# Patient Record
Sex: MALE | Race: BLACK OR AFRICAN AMERICAN | NOT HISPANIC OR LATINO | URBAN - METROPOLITAN AREA
[De-identification: names, ages, dates, MRNs, and addresses within clinical notes are randomized per-mention and may not be internally consistent; named-entity substitution may affect disease eponyms.]

---

## 2023-07-03 ENCOUNTER — HOSPITAL ENCOUNTER (INPATIENT)
Facility: HOSPITAL | Age: 43
LOS: 3 days | Discharge: HOME | DRG: 580 | End: 2023-07-06
Attending: EMERGENCY MEDICINE | Admitting: SURGERY

## 2023-07-03 DIAGNOSIS — T14.90XA TRAUMA: ICD-10-CM

## 2023-07-03 DIAGNOSIS — S81.801A: Primary | ICD-10-CM

## 2023-07-03 LAB
ALBUMIN SERPL-MCNC: 4.2 G/DL (ref 3.5–5)
ALBUMIN/GLOB SERPL: 1.6 RATIO (ref 1.1–2)
ALP SERPL-CCNC: 50 UNIT/L (ref 40–150)
ALT SERPL-CCNC: 31 UNIT/L (ref 0–55)
AST SERPL-CCNC: 34 UNIT/L (ref 5–34)
BASOPHILS # BLD AUTO: 0.04 X10(3)/MCL
BASOPHILS NFR BLD AUTO: 0.4 %
BILIRUBIN DIRECT+TOT PNL SERPL-MCNC: 0.6 MG/DL
BUN SERPL-MCNC: 10.8 MG/DL (ref 8.9–20.6)
CALCIUM SERPL-MCNC: 8.9 MG/DL (ref 8.4–10.2)
CHLORIDE SERPL-SCNC: 107 MMOL/L (ref 98–107)
CO2 SERPL-SCNC: 21 MMOL/L (ref 22–29)
CREAT SERPL-MCNC: 1.44 MG/DL (ref 0.73–1.18)
EOSINOPHIL # BLD AUTO: 0.07 X10(3)/MCL (ref 0–0.9)
EOSINOPHIL NFR BLD AUTO: 0.7 %
ERYTHROCYTE [DISTWIDTH] IN BLOOD BY AUTOMATED COUNT: 13.4 % (ref 11.5–17)
GFR SERPLBLD CREATININE-BSD FMLA CKD-EPI: >60 MLS/MIN/1.73/M2
GLOBULIN SER-MCNC: 2.7 GM/DL (ref 2.4–3.5)
GLUCOSE SERPL-MCNC: 96 MG/DL (ref 74–100)
HCT VFR BLD AUTO: 40.2 % (ref 42–52)
HGB BLD-MCNC: 13.8 G/DL (ref 14–18)
IMM GRANULOCYTES # BLD AUTO: 0.05 X10(3)/MCL (ref 0–0.04)
IMM GRANULOCYTES NFR BLD AUTO: 0.5 %
LYMPHOCYTES # BLD AUTO: 2.08 X10(3)/MCL (ref 0.6–4.6)
LYMPHOCYTES NFR BLD AUTO: 20.9 %
MCH RBC QN AUTO: 33.2 PG (ref 27–31)
MCHC RBC AUTO-ENTMCNC: 34.3 G/DL (ref 33–36)
MCV RBC AUTO: 96.6 FL (ref 80–94)
MONOCYTES # BLD AUTO: 0.81 X10(3)/MCL (ref 0.1–1.3)
MONOCYTES NFR BLD AUTO: 8.1 %
NEUTROPHILS # BLD AUTO: 6.9 X10(3)/MCL (ref 2.1–9.2)
NEUTROPHILS NFR BLD AUTO: 69.4 %
NRBC BLD AUTO-RTO: 0 %
PLATELET # BLD AUTO: 316 X10(3)/MCL (ref 130–400)
PMV BLD AUTO: 9.2 FL (ref 7.4–10.4)
POTASSIUM SERPL-SCNC: 3.9 MMOL/L (ref 3.5–5.1)
PROT SERPL-MCNC: 6.9 GM/DL (ref 6.4–8.3)
RBC # BLD AUTO: 4.16 X10(6)/MCL (ref 4.7–6.1)
SODIUM SERPL-SCNC: 138 MMOL/L (ref 136–145)
WBC # SPEC AUTO: 9.95 X10(3)/MCL (ref 4.5–11.5)

## 2023-07-03 PROCEDURE — 63600175 PHARM REV CODE 636 W HCPCS: Performed by: EMERGENCY MEDICINE

## 2023-07-03 PROCEDURE — 80053 COMPREHEN METABOLIC PANEL: CPT | Performed by: EMERGENCY MEDICINE

## 2023-07-03 PROCEDURE — 63600175 PHARM REV CODE 636 W HCPCS

## 2023-07-03 PROCEDURE — 25000003 PHARM REV CODE 250: Performed by: EMERGENCY MEDICINE

## 2023-07-03 PROCEDURE — 96365 THER/PROPH/DIAG IV INF INIT: CPT

## 2023-07-03 PROCEDURE — 90471 IMMUNIZATION ADMIN: CPT | Performed by: EMERGENCY MEDICINE

## 2023-07-03 PROCEDURE — 99285 EMERGENCY DEPT VISIT HI MDM: CPT | Mod: 25

## 2023-07-03 PROCEDURE — 90715 TDAP VACCINE 7 YRS/> IM: CPT | Performed by: EMERGENCY MEDICINE

## 2023-07-03 PROCEDURE — 11000001 HC ACUTE MED/SURG PRIVATE ROOM

## 2023-07-03 PROCEDURE — 85025 COMPLETE CBC W/AUTO DIFF WBC: CPT | Performed by: EMERGENCY MEDICINE

## 2023-07-03 RX ORDER — ENOXAPARIN SODIUM 100 MG/ML
40 INJECTION SUBCUTANEOUS EVERY 12 HOURS
Status: DISCONTINUED | OUTPATIENT
Start: 2023-07-03 | End: 2023-07-06 | Stop reason: HOSPADM

## 2023-07-03 RX ORDER — TALC
6 POWDER (GRAM) TOPICAL NIGHTLY PRN
Status: DISCONTINUED | OUTPATIENT
Start: 2023-07-04 | End: 2023-07-06 | Stop reason: HOSPADM

## 2023-07-03 RX ORDER — METHOCARBAMOL 500 MG/1
500 TABLET, FILM COATED ORAL 3 TIMES DAILY
Status: DISCONTINUED | OUTPATIENT
Start: 2023-07-04 | End: 2023-07-06 | Stop reason: HOSPADM

## 2023-07-03 RX ORDER — POLYETHYLENE GLYCOL 3350 17 G/17G
17 POWDER, FOR SOLUTION ORAL 2 TIMES DAILY
Status: DISCONTINUED | OUTPATIENT
Start: 2023-07-03 | End: 2023-07-06 | Stop reason: HOSPADM

## 2023-07-03 RX ORDER — GABAPENTIN 300 MG/1
300 CAPSULE ORAL 3 TIMES DAILY
Status: DISCONTINUED | OUTPATIENT
Start: 2023-07-04 | End: 2023-07-06 | Stop reason: HOSPADM

## 2023-07-03 RX ORDER — OXYCODONE HYDROCHLORIDE 5 MG/1
5 TABLET ORAL EVERY 4 HOURS PRN
Status: DISCONTINUED | OUTPATIENT
Start: 2023-07-04 | End: 2023-07-06 | Stop reason: HOSPADM

## 2023-07-03 RX ORDER — SODIUM CHLORIDE, SODIUM LACTATE, POTASSIUM CHLORIDE, CALCIUM CHLORIDE 600; 310; 30; 20 MG/100ML; MG/100ML; MG/100ML; MG/100ML
INJECTION, SOLUTION INTRAVENOUS CONTINUOUS
Status: DISCONTINUED | OUTPATIENT
Start: 2023-07-03 | End: 2023-07-06 | Stop reason: HOSPADM

## 2023-07-03 RX ORDER — DOCUSATE SODIUM 100 MG/1
100 CAPSULE, LIQUID FILLED ORAL 2 TIMES DAILY
Status: DISCONTINUED | OUTPATIENT
Start: 2023-07-03 | End: 2023-07-06 | Stop reason: HOSPADM

## 2023-07-03 RX ORDER — ADHESIVE BANDAGE
30 BANDAGE TOPICAL DAILY PRN
Status: DISCONTINUED | OUTPATIENT
Start: 2023-07-04 | End: 2023-07-06 | Stop reason: HOSPADM

## 2023-07-03 RX ORDER — OXYCODONE HYDROCHLORIDE 10 MG/1
10 TABLET ORAL EVERY 4 HOURS PRN
Status: DISCONTINUED | OUTPATIENT
Start: 2023-07-04 | End: 2023-07-06 | Stop reason: HOSPADM

## 2023-07-03 RX ORDER — ACETAMINOPHEN 325 MG/1
650 TABLET ORAL EVERY 4 HOURS
Status: DISCONTINUED | OUTPATIENT
Start: 2023-07-04 | End: 2023-07-06 | Stop reason: HOSPADM

## 2023-07-03 RX ADMIN — SODIUM CHLORIDE, POTASSIUM CHLORIDE, SODIUM LACTATE AND CALCIUM CHLORIDE: 600; 310; 30; 20 INJECTION, SOLUTION INTRAVENOUS at 11:07

## 2023-07-03 RX ADMIN — PIPERACILLIN AND TAZOBACTAM 4.5 G: 4; .5 INJECTION, POWDER, LYOPHILIZED, FOR SOLUTION INTRAVENOUS; PARENTERAL at 10:07

## 2023-07-03 RX ADMIN — TETANUS TOXOID, REDUCED DIPHTHERIA TOXOID AND ACELLULAR PERTUSSIS VACCINE, ADSORBED 0.5 ML: 5; 2.5; 8; 8; 2.5 SUSPENSION INTRAMUSCULAR at 10:07

## 2023-07-03 RX ADMIN — OXYCODONE HYDROCHLORIDE 5 MG: 5 TABLET ORAL at 11:07

## 2023-07-04 LAB
ALBUMIN SERPL-MCNC: 3.8 G/DL (ref 3.5–5)
ALBUMIN/GLOB SERPL: 1.5 RATIO (ref 1.1–2)
ALP SERPL-CCNC: 46 UNIT/L (ref 40–150)
ALT SERPL-CCNC: 28 UNIT/L (ref 0–55)
AST SERPL-CCNC: 33 UNIT/L (ref 5–34)
BASOPHILS # BLD AUTO: 0.03 X10(3)/MCL
BASOPHILS NFR BLD AUTO: 0.2 %
BILIRUBIN DIRECT+TOT PNL SERPL-MCNC: 0.7 MG/DL
BUN SERPL-MCNC: 10.8 MG/DL (ref 8.9–20.6)
CALCIUM SERPL-MCNC: 8.9 MG/DL (ref 8.4–10.2)
CHLORIDE SERPL-SCNC: 108 MMOL/L (ref 98–107)
CO2 SERPL-SCNC: 22 MMOL/L (ref 22–29)
CREAT SERPL-MCNC: 1.29 MG/DL (ref 0.73–1.18)
EOSINOPHIL # BLD AUTO: 0.04 X10(3)/MCL (ref 0–0.9)
EOSINOPHIL NFR BLD AUTO: 0.3 %
ERYTHROCYTE [DISTWIDTH] IN BLOOD BY AUTOMATED COUNT: 13.5 % (ref 11.5–17)
GFR SERPLBLD CREATININE-BSD FMLA CKD-EPI: >60 MLS/MIN/1.73/M2
GLOBULIN SER-MCNC: 2.5 GM/DL (ref 2.4–3.5)
GLUCOSE SERPL-MCNC: 110 MG/DL (ref 74–100)
HCT VFR BLD AUTO: 39 % (ref 42–52)
HGB BLD-MCNC: 13.4 G/DL (ref 14–18)
IMM GRANULOCYTES # BLD AUTO: 0.04 X10(3)/MCL (ref 0–0.04)
IMM GRANULOCYTES NFR BLD AUTO: 0.3 %
LYMPHOCYTES # BLD AUTO: 3.44 X10(3)/MCL (ref 0.6–4.6)
LYMPHOCYTES NFR BLD AUTO: 25.5 %
MAGNESIUM SERPL-MCNC: 2.1 MG/DL (ref 1.6–2.6)
MCH RBC QN AUTO: 32.8 PG (ref 27–31)
MCHC RBC AUTO-ENTMCNC: 34.4 G/DL (ref 33–36)
MCV RBC AUTO: 95.4 FL (ref 80–94)
MONOCYTES # BLD AUTO: 1.25 X10(3)/MCL (ref 0.1–1.3)
MONOCYTES NFR BLD AUTO: 9.3 %
NEUTROPHILS # BLD AUTO: 8.69 X10(3)/MCL (ref 2.1–9.2)
NEUTROPHILS NFR BLD AUTO: 64.4 %
NRBC BLD AUTO-RTO: 0 %
PHOSPHATE SERPL-MCNC: 4.7 MG/DL (ref 2.3–4.7)
PLATELET # BLD AUTO: 287 X10(3)/MCL (ref 130–400)
PMV BLD AUTO: 9.3 FL (ref 7.4–10.4)
POTASSIUM SERPL-SCNC: 4.2 MMOL/L (ref 3.5–5.1)
PROT SERPL-MCNC: 6.3 GM/DL (ref 6.4–8.3)
RBC # BLD AUTO: 4.09 X10(6)/MCL (ref 4.7–6.1)
SODIUM SERPL-SCNC: 140 MMOL/L (ref 136–145)
WBC # SPEC AUTO: 13.49 X10(3)/MCL (ref 4.5–11.5)

## 2023-07-04 PROCEDURE — 85025 COMPLETE CBC W/AUTO DIFF WBC: CPT

## 2023-07-04 PROCEDURE — 80053 COMPREHEN METABOLIC PANEL: CPT

## 2023-07-04 PROCEDURE — 84100 ASSAY OF PHOSPHORUS: CPT

## 2023-07-04 PROCEDURE — 11000001 HC ACUTE MED/SURG PRIVATE ROOM

## 2023-07-04 PROCEDURE — 25000003 PHARM REV CODE 250: Performed by: NURSE PRACTITIONER

## 2023-07-04 PROCEDURE — 25000003 PHARM REV CODE 250

## 2023-07-04 PROCEDURE — 63600175 PHARM REV CODE 636 W HCPCS

## 2023-07-04 PROCEDURE — 83735 ASSAY OF MAGNESIUM: CPT

## 2023-07-04 RX ORDER — AMOXICILLIN AND CLAVULANATE POTASSIUM 875; 125 MG/1; MG/1
1 TABLET, FILM COATED ORAL EVERY 12 HOURS
Status: DISCONTINUED | OUTPATIENT
Start: 2023-07-04 | End: 2023-07-06 | Stop reason: HOSPADM

## 2023-07-04 RX ORDER — KETAMINE HCL IN 0.9 % NACL 50 MG/5 ML
SYRINGE (ML) INTRAVENOUS
Status: COMPLETED
Start: 2023-07-04 | End: 2023-07-04

## 2023-07-04 RX ADMIN — ACETAMINOPHEN 650 MG: 325 TABLET, FILM COATED ORAL at 10:07

## 2023-07-04 RX ADMIN — GABAPENTIN 300 MG: 300 CAPSULE ORAL at 08:07

## 2023-07-04 RX ADMIN — OXYCODONE HYDROCHLORIDE 5 MG: 5 TABLET ORAL at 08:07

## 2023-07-04 RX ADMIN — METHOCARBAMOL 500 MG: 500 TABLET ORAL at 02:07

## 2023-07-04 RX ADMIN — ENOXAPARIN SODIUM 40 MG: 40 INJECTION SUBCUTANEOUS at 09:07

## 2023-07-04 RX ADMIN — OXYCODONE HYDROCHLORIDE 5 MG: 5 TABLET ORAL at 04:07

## 2023-07-04 RX ADMIN — POLYETHYLENE GLYCOL 3350 17 G: 17 POWDER, FOR SOLUTION ORAL at 08:07

## 2023-07-04 RX ADMIN — GABAPENTIN 300 MG: 300 CAPSULE ORAL at 09:07

## 2023-07-04 RX ADMIN — METHOCARBAMOL 500 MG: 500 TABLET ORAL at 08:07

## 2023-07-04 RX ADMIN — ENOXAPARIN SODIUM 40 MG: 40 INJECTION SUBCUTANEOUS at 08:07

## 2023-07-04 RX ADMIN — METHOCARBAMOL 500 MG: 500 TABLET ORAL at 09:07

## 2023-07-04 RX ADMIN — ACETAMINOPHEN 650 MG: 325 TABLET, FILM COATED ORAL at 06:07

## 2023-07-04 RX ADMIN — ACETAMINOPHEN 650 MG: 325 TABLET, FILM COATED ORAL at 02:07

## 2023-07-04 RX ADMIN — AMOXICILLIN AND CLAVULANATE POTASSIUM 1 TABLET: 875; 125 TABLET ORAL at 09:07

## 2023-07-04 RX ADMIN — GABAPENTIN 300 MG: 300 CAPSULE ORAL at 02:07

## 2023-07-04 RX ADMIN — DOCUSATE SODIUM 100 MG: 100 CAPSULE, LIQUID FILLED ORAL at 09:07

## 2023-07-04 RX ADMIN — PIPERACILLIN AND TAZOBACTAM 4.5 G: 4; .5 INJECTION, POWDER, LYOPHILIZED, FOR SOLUTION INTRAVENOUS; PARENTERAL at 06:07

## 2023-07-04 RX ADMIN — OXYCODONE HYDROCHLORIDE 10 MG: 10 TABLET ORAL at 09:07

## 2023-07-04 RX ADMIN — DOCUSATE SODIUM 100 MG: 100 CAPSULE, LIQUID FILLED ORAL at 08:07

## 2023-07-04 RX ADMIN — Medication 150 MG: at 12:07

## 2023-07-04 NOTE — CONSULTS
History           Chief Complaint   Patient presents with    Animal Bite       Pt to ed with dog bite to RLE with Avulsion- bleeding controlled without the need for a  tourniquet. Pms intact. Pt already gave statement to MAKEDA Zapata       44 y/o male with no pertinent pmhx presents to ED via EMS for a dog bite to his right leg with Avulsion. Pt reports his neighbor's dog bit him for the first time after walking out of his house. States he was able to ambulate after. He was given Toradol en route. Per triage, bleeding controlled without the need for a tourniquet.     The history is provided by the patient. No  was used.   Review of patient's allergies indicates:  No Known Allergies  No past medical history on file.  No past surgical history on file.  No family history on file.  Review of Systems   Constitutional:  Negative for fatigue, fever and unexpected weight change.   HENT:  Negative for congestion and rhinorrhea.    Eyes:  Negative for pain.   Respiratory:  Negative for chest tightness, shortness of breath and wheezing.    Cardiovascular:  Negative for chest pain.   Gastrointestinal:  Negative for abdominal pain, constipation, diarrhea, nausea and vomiting.   Genitourinary:  Negative for dysuria.   Musculoskeletal:  Negative for back pain and neck pain.        Right leg pain following dog bite   Skin:  Positive for wound.   Allergic/Immunologic: Negative for environmental allergies, food allergies and immunocompromised state.   Neurological:  Negative for dizziness and speech difficulty.   Hematological:  Does not bruise/bleed easily.   Psychiatric/Behavioral:  Negative for sleep disturbance and suicidal ideas.       Physical Exam             Initial Vitals [07/03/23 2207]   BP Pulse Resp Temp SpO2   124/74 75 16 98.6 °F (37 °C) 98 %       MAP           --              Physical Exam     Nursing note and vitals reviewed.  Constitutional: He appears well-developed and well-nourished. No  distress.   HENT:   Head: Normocephalic and atraumatic.   Eyes: EOM are normal. Pupils are equal, round, and reactive to light.   Neck: Trachea normal. Neck supple.   Normal range of motion.  Cardiovascular:  Normal rate and regular rhythm.           No murmur heard.  Pulmonary/Chest: Breath sounds normal. No respiratory distress.   Abdominal: Abdomen is soft. Bowel sounds are normal. He exhibits no distension. There is no abdominal tenderness.   Musculoskeletal:      Cervical back: Normal range of motion and neck supple.      Lumbar back: Normal.      Comments: 15x10 cm Avulsion with exposed muscle belly to right lower extremity.      Neurological: He is alert and oriented to person, place, and time. He has normal strength. No cranial nerve deficit.   Skin: Skin is warm and dry. No rash noted.   Psychiatric: He has a normal mood and affect. Judgment normal.                          ED Course   Procedures        Labs Reviewed   COMPREHENSIVE METABOLIC PANEL - Abnormal; Notable for the following components:       Result Value      Carbon Dioxide 21 (*)       Creatinine 1.44 (*)       All other components within normal limits   CBC WITH DIFFERENTIAL - Abnormal; Notable for the following components:     RBC 4.16 (*)       Hgb 13.8 (*)       Hct 40.2 (*)       MCV 96.6 (*)       MCH 33.2 (*)       IG# 0.05 (*)       All other components within normal limits   CBC W/ AUTO DIFFERENTIAL     Narrative:      The following orders were created for panel order CBC auto differential.  Procedure                               Abnormality         Status                     ---------                               -----------         ------                     CBC with Differential[[id number]]        Abnormal            Final result                  Please view results for these tests on the individual orders.            Imaging Results                  X-Ray Tibia Fibula 2 View Right (Final result)  Result time 07/03/23 23:25:18             Final result by Vadim Stovall MD (07/03/23 23:25:18)                           Impression:        Severe soft tissue injury.        Electronically signed by:        Vadim Stovall  Date:                                        07/03/2023  Time:                                                23:25                     Narrative:     EXAMINATION:  XR TIBIA FIBULA 2 VIEW RIGHT     CLINICAL HISTORY:  Injury, unspecified, initial encounter     TECHNIQUE:  Two views     COMPARISON:  None available     FINDINGS:  There is severe soft tissue injury with inflammations and emphysema mid to distal posterior calf.  A definite radiopaque foreign body is not visualized.  No acute fracture or dislocation.                                               Medications   lactated ringers infusion ( Intravenous New Bag 7/3/23 2339)   enoxaparin injection 40 mg (40 mg Subcutaneous Not Given 7/3/23 2345)   acetaminophen tablet 650 mg (has no administration in time range)   oxyCODONE immediate release tablet 5 mg (5 mg Oral Given 7/3/23 2353)   oxyCODONE immediate release tablet Tab 10 mg (has no administration in time range)   methocarbamoL tablet 500 mg (has no administration in time range)   gabapentin capsule 300 mg (has no administration in time range)   melatonin tablet 6 mg (has no administration in time range)   polyethylene glycol packet 17 g (17 g Oral Not Given 7/3/23 2345)   docusate sodium capsule 100 mg (100 mg Oral Not Given 7/3/23 2345)   magnesium hydroxide 400 mg/5 ml suspension 2,400 mg (2,400 mg Oral Not Given 7/3/23 2349)   piperacillin-tazobactam (ZOSYN) 4.5 g in dextrose 5 % in water (D5W) 5 % 100 mL IVPB (MB+) (has no administration in time range)   ketamine in 0.9 % sod chloride 50 mg/5 mL (10 mg/mL) injection (has no administration in time range)   piperacillin-tazobactam (ZOSYN) 4.5 g in dextrose 5 % in water (D5W) 5 % 100 mL IVPB (MB+) (0 g Intravenous Stopped 7/3/23 2306)   Tdap (BOOSTRIX) vaccine injection  0.5 mL (0.5 mLs Intramuscular Given 7/3/23 2231)                   Medical Decision Making  The differential diagnosis includes, but is not limited to: fracture, avulsion, foreign body, and laceration.     Bleeding well-controlled neurovascularly intact large avulsion patient tachycardic.  Discussed case with Trauma surgery radiographically without abnormality will admit and washout and evaluate for graft     Problems Addressed:  Avulsion of right lower extremity, initial encounter: complicated acute illness or injury that poses a threat to life or bodily functions  Trauma: acute illness or injury     Amount and/or Complexity of Data Reviewed  Labs: ordered. Decision-making details documented in ED Course.  Radiology: ordered and independent interpretation performed. Decision-making details documented in ED Course.  Discussion of management or test interpretation with external provider(s): Surgical hospitalist trauma surgeon     Risk  Decision regarding hospitalization.               ED Course as of 07/04/23 0105   Mon Jul 03, 2023 2223 Consulted trauma [MM]   2322 Large avulsion discussed case with surgery [FK]       ED Course User Index  [FK] Arian Garcia III, MD  [MM] Radha Campos             Clinical Impression:   Agree with wound vac  Wound will require some time to granulate  Please Set pt up from home wound vac with regular vac changes   Will plan for [facility] as outpt in a few weeks when granulation begins to form  Will follow while here

## 2023-07-04 NOTE — ED PROVIDER NOTES
Encounter Date: 7/3/2023    SCRIBE #1 NOTE: I, Sandra Vi, am scribing for, and in the presence of,  Arian Garcia III, MD. I have scribed the following portions of the note - Other sections scribed: HPI, ROS, PE.     History     Chief Complaint   Patient presents with    Animal Bite     Pt to ed with dog bite to RLE with Avulsion- bleeding controlled without the need for a  tourniquet. Pms intact. Pt already gave statement to MAKEDA Zapata      44 y/o male with no pertinent pmhx presents to ED via EMS for a dog bite to his right leg with Avulsion. Pt reports his neighbor's dog bit him for the first time after walking out of his house. States he was able to ambulate after. He was given Toradol en route. Per triage, bleeding controlled without the need for a tourniquet.    The history is provided by the patient. No  was used.   Review of patient's allergies indicates:  No Known Allergies  No past medical history on file.  No past surgical history on file.  No family history on file.     Review of Systems   Constitutional:  Negative for fatigue, fever and unexpected weight change.   HENT:  Negative for congestion and rhinorrhea.    Eyes:  Negative for pain.   Respiratory:  Negative for chest tightness, shortness of breath and wheezing.    Cardiovascular:  Negative for chest pain.   Gastrointestinal:  Negative for abdominal pain, constipation, diarrhea, nausea and vomiting.   Genitourinary:  Negative for dysuria.   Musculoskeletal:  Negative for back pain and neck pain.        Right leg pain following dog bite   Skin:  Positive for wound.   Allergic/Immunologic: Negative for environmental allergies, food allergies and immunocompromised state.   Neurological:  Negative for dizziness and speech difficulty.   Hematological:  Does not bruise/bleed easily.   Psychiatric/Behavioral:  Negative for sleep disturbance and suicidal ideas.      Physical Exam     Initial Vitals [07/03/23 2207]   BP Pulse Resp Temp  SpO2   124/74 75 16 98.6 °F (37 °C) 98 %      MAP       --         Physical Exam    Nursing note and vitals reviewed.  Constitutional: He appears well-developed and well-nourished. No distress.   HENT:   Head: Normocephalic and atraumatic.   Eyes: EOM are normal. Pupils are equal, round, and reactive to light.   Neck: Trachea normal. Neck supple.   Normal range of motion.  Cardiovascular:  Normal rate and regular rhythm.           No murmur heard.  Pulmonary/Chest: Breath sounds normal. No respiratory distress.   Abdominal: Abdomen is soft. Bowel sounds are normal. He exhibits no distension. There is no abdominal tenderness.   Musculoskeletal:      Cervical back: Normal range of motion and neck supple.      Lumbar back: Normal.      Comments: 15x10 cm Avulsion with exposed muscle belly to right lower extremity.     Neurological: He is alert and oriented to person, place, and time. He has normal strength. No cranial nerve deficit.   Skin: Skin is warm and dry. No rash noted.   Psychiatric: He has a normal mood and affect. Judgment normal.                     ED Course   Procedures  Labs Reviewed   COMPREHENSIVE METABOLIC PANEL - Abnormal; Notable for the following components:       Result Value    Carbon Dioxide 21 (*)     Creatinine 1.44 (*)     All other components within normal limits   CBC WITH DIFFERENTIAL - Abnormal; Notable for the following components:    RBC 4.16 (*)     Hgb 13.8 (*)     Hct 40.2 (*)     MCV 96.6 (*)     MCH 33.2 (*)     IG# 0.05 (*)     All other components within normal limits   CBC W/ AUTO DIFFERENTIAL    Narrative:     The following orders were created for panel order CBC auto differential.  Procedure                               Abnormality         Status                     ---------                               -----------         ------                     CBC with Differential[[id number]]        Abnormal            Final result                 Please view results for these tests on  the individual orders.          Imaging Results              X-Ray Tibia Fibula 2 View Right (Final result)  Result time 07/03/23 23:25:18      Final result by Vadim Stovall MD (07/03/23 23:25:18)                   Impression:      Severe soft tissue injury.      Electronically signed by: Vadim Stovall  Date:    07/03/2023  Time:    23:25               Narrative:    EXAMINATION:  XR TIBIA FIBULA 2 VIEW RIGHT    CLINICAL HISTORY:  Injury, unspecified, initial encounter    TECHNIQUE:  Two views    COMPARISON:  None available    FINDINGS:  There is severe soft tissue injury with inflammations and emphysema mid to distal posterior calf.  A definite radiopaque foreign body is not visualized.  No acute fracture or dislocation.                                       Medications   lactated ringers infusion ( Intravenous New Bag 7/3/23 2339)   enoxaparin injection 40 mg (40 mg Subcutaneous Not Given 7/3/23 2345)   acetaminophen tablet 650 mg (has no administration in time range)   oxyCODONE immediate release tablet 5 mg (5 mg Oral Given 7/3/23 2353)   oxyCODONE immediate release tablet Tab 10 mg (has no administration in time range)   methocarbamoL tablet 500 mg (has no administration in time range)   gabapentin capsule 300 mg (has no administration in time range)   melatonin tablet 6 mg (has no administration in time range)   polyethylene glycol packet 17 g (17 g Oral Not Given 7/3/23 2345)   docusate sodium capsule 100 mg (100 mg Oral Not Given 7/3/23 2345)   magnesium hydroxide 400 mg/5 ml suspension 2,400 mg (2,400 mg Oral Not Given 7/3/23 2349)   piperacillin-tazobactam (ZOSYN) 4.5 g in dextrose 5 % in water (D5W) 5 % 100 mL IVPB (MB+) (has no administration in time range)   ketamine in 0.9 % sod chloride 50 mg/5 mL (10 mg/mL) injection (has no administration in time range)   piperacillin-tazobactam (ZOSYN) 4.5 g in dextrose 5 % in water (D5W) 5 % 100 mL IVPB (MB+) (0 g Intravenous Stopped 7/3/23 2306)   Tdap (BOOSTRIX)  vaccine injection 0.5 mL (0.5 mLs Intramuscular Given 7/3/23 2231)              Scribe Attestation:   Scribe #1: I performed the above scribed service and the documentation accurately describes the services I performed. I attest to the accuracy of the note.    Attending Attestation:           Physician Attestation for Scribe:  Physician Attestation Statement for Scribe #1: I, Arian Garcia III, MD, reviewed documentation, as scribed by Sandra Lambert in my presence, and it is both accurate and complete.         Medical Decision Making  The differential diagnosis includes, but is not limited to: fracture, avulsion, foreign body, and laceration.    Bleeding well-controlled neurovascularly intact large avulsion patient tachycardic.  Discussed case with Trauma surgery radiographically without abnormality will admit and washout and evaluate for graft    Problems Addressed:  Avulsion of right lower extremity, initial encounter: complicated acute illness or injury that poses a threat to life or bodily functions  Trauma: acute illness or injury    Amount and/or Complexity of Data Reviewed  Labs: ordered. Decision-making details documented in ED Course.  Radiology: ordered and independent interpretation performed. Decision-making details documented in ED Course.  Discussion of management or test interpretation with external provider(s): Surgical hospitalist trauma surgeon    Risk  Decision regarding hospitalization.            ED Course as of 07/04/23 0105 Mon Jul 03, 2023 2223 Consulted trauma [MM]   2322 Large avulsion discussed case with surgery [FK]      ED Course User Index  [FK] Arian Garcia III, MD  [MM] Radha Campos                 Clinical Impression:   Final diagnoses:  [T14.90XA] Trauma  [S81.801A] Avulsion of right lower extremity, initial encounter (Primary)        ED Disposition Condition    Admit                 Arian Garcia III, MD  07/04/23 0106

## 2023-07-04 NOTE — PROCEDURES
Wound Debridement Procedure    Patient Name: Billy Correa  MRN: [id number]  YOB: 1980  Admit Date: 7/3/2023  HD#1  Date of Procedure: 07/04/2023    Procedure: wound washout and laceration repair.  Location: right leg   Laceration dimensions: Approximately 15cm x 10cm   Anesthesia: ketamine sedation     Procedure in Detail:   The wound was exposed and sedation was initiated by staff. The patient was monitored using end tidal CO2 and telemtry. Utilizing a clean technique, the wound was carefully explored for any abnormalities including signs of infection and foreign bodies. Devascularized tissue on the posterior aspect of the leg and the inferior edges was removed sharply. The wound cavity was copiously irrigated with sterile saline to remove any remaining debris. The wound was subsequently cleansed with betadine and irrigated again with sterile saline. Hemostasis was achieved using a combination of pressure and 2-0 Vicryl suture in a figure of eight fashion.  The wound was then dressed with saline moistened gauze, kerlix, and Ace bandages. The patient tolerated the procedure well without complications.     Follow up:  Wound vac to be placed bedside tomorrow   Continue local wound care     Kmimy West MD   [facility] Surgery PGY 4

## 2023-07-04 NOTE — TERTIARY TRAUMA SURVEY NOTE
TERTIARY TRAUMA SURVEY (TTS)    List Injuries Identified to Date:   1. Dog bit to posterior RLE    List Operations and Procedures:   1. Washout and laceration repair  2. Bedside wound vac placement    No past surgical history on file.    Incidental findings:   1. none    Past Medical History:   No past medical history on file.    No past surgical history on file.      Tertiary Physical Exam:     Physical Exam  Constitutional:       Appearance: Normal appearance.   HENT:      Head: Normocephalic.   Eyes:      Extraocular Movements: Extraocular movements intact.   Cardiovascular:      Rate and Rhythm: Normal rate and regular rhythm.      Pulses: Normal pulses.      Comments: 2+ right DP pulse  Pulmonary:      Effort: Pulmonary effort is normal.   Abdominal:      General: Abdomen is flat.      Palpations: Abdomen is soft.   Musculoskeletal:         General: Signs of injury (15cm x 10cm wound on right posterior calf with wound vac in place) present.      Cervical back: Normal range of motion.   Skin:     General: Skin is warm and dry.   Neurological:      General: No focal deficit present.      Mental Status: He is alert.      Comments: Intact sensation of right toes        Imaging Review:     Imaging Results              X-Ray Tibia Fibula 2 View Right (Final result)  Result time 07/03/23 23:25:18      Final result by Vadim Stovall MD (07/03/23 23:25:18)                   Impression:      Severe soft tissue injury.      Electronically signed by: Vadim Stovall  Date:    07/03/2023  Time:    23:25               Narrative:    EXAMINATION:  XR TIBIA FIBULA 2 VIEW RIGHT    CLINICAL HISTORY:  Injury, unspecified, initial encounter    TECHNIQUE:  Two views    COMPARISON:  None available    FINDINGS:  There is severe soft tissue injury with inflammations and emphysema mid to distal posterior calf.  A definite radiopaque foreign body is not visualized.  No acute fracture or dislocation.                                        Lab Review:   CBC:  Recent Labs   Lab Result Units 07/03/23 2235 07/04/23  0444   WBC x10(3)/mcL 9.95 13.49*   RBC x10(6)/mcL 4.16* 4.09*   Hgb g/dL 13.8* 13.4*   Hct % 40.2* 39.0*   Platelet x10(3)/mcL 316 287   MCV fL 96.6* 95.4*   MCH pg 33.2* 32.8*   MCHC g/dL 34.3 34.4       CMP:  Recent Labs   Lab Result Units 07/03/23 2235 07/04/23 0444   Calcium Level Total mg/dL 8.9 8.9   Albumin Level g/dL 4.2 3.8   Sodium Level mmol/L 138 140   Potassium Level mmol/L 3.9 4.2   Carbon Dioxide mmol/L 21* 22   Blood Urea Nitrogen mg/dL 10.8 10.8   Creatinine mg/dL 1.44* 1.29*   Alkaline Phosphatase unit/L 50 46   Alanine Aminotransferase unit/L 31 28   Aspartate Aminotransferase unit/L 34 33   Bilirubin Total mg/dL 0.6 0.7       Troponin:  No results for input(s): TROPONINI in the last 2160 hours.    ETOH:  No results for input(s): ETHANOL in the last 72 hours.     Urine Drug Screen:  No results for input(s): COCAINE, OPIATE, BARBITURATE, AMPHETAMINE, FENTANYL, CANNABINOIDS, MDMA in the last 72 hours.    Invalid input(s): BENZODIAZEPINE, PHENCYCLIDINE     Assessment and Plan:2   Billy Correa is a 43 year old male s/p RLE avulsion from dog bite. Also had CITLALI on admission. Imaging negative for fx.    - regular diet  - home med rec  - transition to PO augmentin on discharge  - mm pain control  - NV checks    Thomas Cardoza MD  Trauma Surgery  7/4/2023 9:47 AM

## 2023-07-04 NOTE — H&P
"   Trauma Surgery   History and Physical Note    Patient Name: Billy Correa  YOB: 1980  Date: 07/03/2023 11:12 PM  Date of Admission: 7/3/2023  HD#0  POD#* No surgery found *    PRESENTING HISTORY   Chief Complaint/Reason for Admission: dog bite    History of Present Illness:  Billy Correa is a 43-year-old male who presents to the ED following dog bite to the right lower extremity with avulsion.  He reports his neighbor's dog, an american bully, bit him after walking out of his house.  Unknown tetanus. Denies past medical history, but he smokes 1 ppd cigarettes.     Review of Systems:  12 point ROS negative except as stated in HPI    PAST HISTORY:   Past medical history:  No past medical history on file.    Past surgical history:  No past surgical history on file.    Family history:  No family history on file.    Social history:  Social History     Socioeconomic History    Marital status:      Social History     Tobacco Use   Smoking Status Not on file   Smokeless Tobacco Not on file      Social History     Substance and Sexual Activity   Alcohol Use Not on file        MEDICATIONS & ALLERGIES:   Allergies: Review of patient's allergies indicates:  No Known Allergies  Home Meds: No current outpatient medications   No current facility-administered medications on file prior to encounter.     No current outpatient medications on file prior to encounter.      No current facility-administered medications on file prior to encounter.     No current outpatient medications on file prior to encounter.     Scheduled Meds:  Continuous Infusions:  PRN Meds:    OBJECTIVE:   Vital Signs:  VITAL SIGNS: 24 HR MIN & MAX LAST   Temp  Min: 98.6 °F (37 °C)  Max: 98.6 °F (37 °C)  98.6 °F (37 °C)   BP  Min: 124/74  Max: 124/74  124/74    Pulse  Min: 75  Max: 75  75    Resp  Min: 16  Max: 16  16    SpO2  Min: 98 %  Max: 98 %  98 %      HT: 5' 7" (170.2 cm)  WT: 88.5 kg (195 lb)  BMI: 30.5   Intake/output: No " intake/output data recorded.     Lines/drains/airway:       Physical Exam:  General:  Well developed, well nourished, no acute distress  HEENT:  Normocephalic, atraumatic  CV:  RR, 2+ DPs bilaterally  Resp: NWOB  GI:  Abdomen soft, non-tender, non-distended  :  Deferred  MSK:  [id number] avulsion w/ muscle belly and tendon exposed of the RLE, NV intact  Neuro: GCS 15. CNII-XII grossly intact, alert and oriented to person, place, and time. Strength and motor function grossly intact to all extremities, sensation intact to all extremities.            Labs:  Troponin:  No results for input(s): TROPONINI in the last 72 hours.  CBC:  Recent Labs     07/03/23  2235   WBC 9.95   RBC 4.16*   HGB 13.8*   HCT 40.2*      MCV 96.6*   MCH 33.2*   MCHC 34.3     CMP:  Recent Labs     07/03/23  2235   CALCIUM 8.9   ALBUMIN 4.2      K 3.9   CO2 21*   BUN 10.8   CREATININE 1.44*   ALKPHOS 50   ALT 31   AST 34   BILITOT 0.6     Lactic Acid:  No results for input(s): LACTATE in the last 72 hours.  ETOH:  No results for input(s): ETHANOL in the last 72 hours.   Urine Drug Screen:  No results for input(s): COCAINE, OPIATE, BARBITURATE, AMPHETAMINE, FENTANYL, CANNABINOIDS, MDMA in the last 72 hours.    Invalid input(s): BENZODIAZEPINE, PHENCYCLIDINE   ABG  No results for input(s): PH, PO2, PCO2, HCO3, BE in the last 168 hours.      Diagnostic Results:  X-Ray Tibia Fibula 2 View Right    (Results Pending)       ASSESSMENT & PLAN:    Billy Correa is a 43 year old male s/p RLE avulsion from dog bite.  CITLALI.    - right tibia fibula x-ray negative for fracture  -admit to Trauma Floor  -NPO  -consult plastics   -maintenance IV fluids  -home med rec  - tetanus   - antibiotics   - mm pain control  - IS  - NV checks     ADDENDUM:  RLE washout at bedside, will place [place] in the morning    7/3/2023 11:13 PM     The above findings, diagnostics, and treatment plan were discussed with Dr. Kal Suggs who will follow with further assessments  and recommendations. Please call with any questions, concerns, or clinical status changes.

## 2023-07-05 LAB
ALBUMIN SERPL-MCNC: 3.4 G/DL (ref 3.5–5)
ALBUMIN/GLOB SERPL: 1.5 RATIO (ref 1.1–2)
ALP SERPL-CCNC: 43 UNIT/L (ref 40–150)
ALT SERPL-CCNC: 20 UNIT/L (ref 0–55)
AST SERPL-CCNC: 24 UNIT/L (ref 5–34)
BASOPHILS # BLD AUTO: 0.04 X10(3)/MCL
BASOPHILS NFR BLD AUTO: 0.5 %
BILIRUBIN DIRECT+TOT PNL SERPL-MCNC: 0.7 MG/DL
BUN SERPL-MCNC: 8.2 MG/DL (ref 8.9–20.6)
CALCIUM SERPL-MCNC: 8.4 MG/DL (ref 8.4–10.2)
CHLORIDE SERPL-SCNC: 110 MMOL/L (ref 98–107)
CO2 SERPL-SCNC: 23 MMOL/L (ref 22–29)
CREAT SERPL-MCNC: 1.28 MG/DL (ref 0.73–1.18)
EOSINOPHIL # BLD AUTO: 0.17 X10(3)/MCL (ref 0–0.9)
EOSINOPHIL NFR BLD AUTO: 2.1 %
ERYTHROCYTE [DISTWIDTH] IN BLOOD BY AUTOMATED COUNT: 13.5 % (ref 11.5–17)
GFR SERPLBLD CREATININE-BSD FMLA CKD-EPI: >60 MLS/MIN/1.73/M2
GLOBULIN SER-MCNC: 2.3 GM/DL (ref 2.4–3.5)
GLUCOSE SERPL-MCNC: 100 MG/DL (ref 74–100)
HCT VFR BLD AUTO: 38.5 % (ref 42–52)
HGB BLD-MCNC: 12.9 G/DL (ref 14–18)
IMM GRANULOCYTES # BLD AUTO: 0.02 X10(3)/MCL (ref 0–0.04)
IMM GRANULOCYTES NFR BLD AUTO: 0.2 %
LYMPHOCYTES # BLD AUTO: 3.51 X10(3)/MCL (ref 0.6–4.6)
LYMPHOCYTES NFR BLD AUTO: 42.4 %
MCH RBC QN AUTO: 32.7 PG (ref 27–31)
MCHC RBC AUTO-ENTMCNC: 33.5 G/DL (ref 33–36)
MCV RBC AUTO: 97.7 FL (ref 80–94)
MONOCYTES # BLD AUTO: 0.93 X10(3)/MCL (ref 0.1–1.3)
MONOCYTES NFR BLD AUTO: 11.2 %
NEUTROPHILS # BLD AUTO: 3.6 X10(3)/MCL (ref 2.1–9.2)
NEUTROPHILS NFR BLD AUTO: 43.6 %
NRBC BLD AUTO-RTO: 0 %
PLATELET # BLD AUTO: 286 X10(3)/MCL (ref 130–400)
PMV BLD AUTO: 9.9 FL (ref 7.4–10.4)
POTASSIUM SERPL-SCNC: 4.1 MMOL/L (ref 3.5–5.1)
PROT SERPL-MCNC: 5.7 GM/DL (ref 6.4–8.3)
RBC # BLD AUTO: 3.94 X10(6)/MCL (ref 4.7–6.1)
SODIUM SERPL-SCNC: 140 MMOL/L (ref 136–145)
WBC # SPEC AUTO: 8.27 X10(3)/MCL (ref 4.5–11.5)

## 2023-07-05 PROCEDURE — 80053 COMPREHEN METABOLIC PANEL: CPT

## 2023-07-05 PROCEDURE — 85025 COMPLETE CBC W/AUTO DIFF WBC: CPT

## 2023-07-05 PROCEDURE — 25000003 PHARM REV CODE 250: Performed by: NURSE PRACTITIONER

## 2023-07-05 PROCEDURE — 94761 N-INVAS EAR/PLS OXIMETRY MLT: CPT

## 2023-07-05 PROCEDURE — 25000003 PHARM REV CODE 250

## 2023-07-05 PROCEDURE — 63600175 PHARM REV CODE 636 W HCPCS

## 2023-07-05 PROCEDURE — 11000001 HC ACUTE MED/SURG PRIVATE ROOM

## 2023-07-05 RX ADMIN — GABAPENTIN 300 MG: 300 CAPSULE ORAL at 09:07

## 2023-07-05 RX ADMIN — OXYCODONE HYDROCHLORIDE 10 MG: 10 TABLET ORAL at 09:07

## 2023-07-05 RX ADMIN — ACETAMINOPHEN 650 MG: 325 TABLET, FILM COATED ORAL at 02:07

## 2023-07-05 RX ADMIN — ACETAMINOPHEN 650 MG: 325 TABLET, FILM COATED ORAL at 09:07

## 2023-07-05 RX ADMIN — OXYCODONE HYDROCHLORIDE 10 MG: 10 TABLET ORAL at 05:07

## 2023-07-05 RX ADMIN — ENOXAPARIN SODIUM 40 MG: 40 INJECTION SUBCUTANEOUS at 09:07

## 2023-07-05 RX ADMIN — DOCUSATE SODIUM 100 MG: 100 CAPSULE, LIQUID FILLED ORAL at 09:07

## 2023-07-05 RX ADMIN — AMOXICILLIN AND CLAVULANATE POTASSIUM 1 TABLET: 875; 125 TABLET ORAL at 09:07

## 2023-07-05 RX ADMIN — METHOCARBAMOL 500 MG: 500 TABLET ORAL at 02:07

## 2023-07-05 RX ADMIN — ACETAMINOPHEN 325 MG: 325 TABLET, FILM COATED ORAL at 06:07

## 2023-07-05 RX ADMIN — METHOCARBAMOL 500 MG: 500 TABLET ORAL at 09:07

## 2023-07-05 RX ADMIN — GABAPENTIN 300 MG: 300 CAPSULE ORAL at 02:07

## 2023-07-05 NOTE — PROGRESS NOTES
Trauma Surgery   Daily Progress Note     HD#2  POD#1 Day Post-Op    Subjective  AF, VSS  NAEON  Pain well controlled  Wound vac output 150cc     Scheduled Meds:   acetaminophen  650 mg Oral Q4H    amoxicillin-clavulanate 875-125mg  1 tablet Oral Q12H    docusate sodium  100 mg Oral BID    enoxparin  40 mg Subcutaneous Q12H    gabapentin  300 mg Oral TID    methocarbamoL  500 mg Oral TID    polyethylene glycol  17 g Oral BID       Continuous Infusions:   lactated ringers Stopped (07/04/23 0610)       PRN Meds:magnesium hydroxide 400 mg/5 ml, melatonin, oxyCODONE, oxyCODONE     Objective  Temp:  [97.8 °F (36.6 °C)-98.4 °F (36.9 °C)] 97.9 °F (36.6 °C)  Pulse:  [55-63] 63  Resp:  [16-18] 18  SpO2:  [95 %-97 %] 96 %  BP: (116-141)/(68-83) 141/83     Gen: NAD, AAOx3  HEENT: EOMI  CV: RR  Resp: no shortness of breath, normal WOB   Abd: soft, non-distended, ATTP    Ext:  Full ROM. Wound vac in place over right posterior calf wound with serosanguinous drainage     Labs  Recent Labs     07/03/23 2235 07/04/23  0444 07/05/23  0507   WBC 9.95 13.49* 8.27   HGB 13.8* 13.4* 12.9*   HCT 40.2* 39.0* 38.5*    287 286     Recent Labs     07/03/23 2235 07/04/23  0444 07/05/23  0507    140 140   K 3.9 4.2 4.1   CO2 21* 22 23   BUN 10.8 10.8 8.2*   CREATININE 1.44* 1.29* 1.28*   CALCIUM 8.9 8.9 8.4   MG  --  2.10  --    PHOS  --  4.7  --    ALBUMIN 4.2 3.8 3.4*   BILITOT 0.6 0.7 0.7   AST 34 33 24   ALKPHOS 50 46 43   ALT 31 28 20     No results for input(s): POCTGLUCOSE in the last 72 hours.     Imaging  No results found in the last 24 hours.       Assessment/Plan  Consults:   Plastic Surgery   Therapy:  Physical Therapy Weight bearing status:   RUE: WBAT  LUE: WBAT  RLE: WBAT  LLE: WBAT Precautions:  Standard   Seizure prophylaxis:  Not indicated. VTE prophylaxis:     Prophylactic Lovenox 40mg BID  GI prophylaxis:  Not indicated. Tolerating ordered diet.   Outpatient follow up:  [facility]  Plastic surgery  Disposition:  Home with home health     Billy Correa is a 43 year old male s/p RLE avulsion from dog bite. Also had CITLALI on admission. Imaging negative for fx.    - CITLALI resolving, cont fluids  - regular diet  - augmentin  - mm pain control  - NV checks  - CM about home health    Thomas Cardoza MD  Trauma Surgery  7/5/2023 10:51 AM

## 2023-07-05 NOTE — PLAN OF CARE
Problem: Adult Inpatient Plan of Care  Goal: Plan of Care Review  Outcome: Ongoing, Progressing  Goal: Patient-Specific Goal (Individualized)  Outcome: Ongoing, Progressing  Goal: Absence of Hospital-Acquired Illness or Injury  Outcome: Ongoing, Progressing  Goal: Optimal Comfort and Wellbeing  Outcome: Ongoing, Progressing  Goal: Readiness for Transition of Care  Outcome: Ongoing, Progressing     Problem: Impaired Wound Healing  Goal: Optimal Wound Healing  Outcome: Ongoing, Progressing     Problem: Pain Acute  Goal: Acceptable Pain Control and Functional Ability  Outcome: Ongoing, Progressing     Problem: Infection  Goal: Absence of Infection Signs and Symptoms  Outcome: Ongoing, Progressing

## 2023-07-05 NOTE — NURSING
Nurses Note -- 4 Eyes      7/5/2023   3:03 AM      Skin assessed during: Admit      [] No Altered Skin Integrity Present    []Prevention Measures Documented      [x] Yes- Altered Skin Integrity Present or Discovered   [] LDA Added if Not in Epic (Describe Wound)   [x] New Altered Skin Integrity was Present on Admit and Documented in LDA   [x] Wound Image Taken    Wound Care Consulted? Yes    Attending Nurse:  Nisreen Camarillo RN     Second RN/Staff Member:  Sade Saavedra

## 2023-07-05 NOTE — PLAN OF CARE
07/05/23 1227   Discharge Assessment   Assessment Type Discharge Planning Assessment   Confirmed/corrected address, phone number and insurance Yes   Confirmed Demographics Correct on Facesheet   Source of Information patient   When was your last doctors appointment?   (Pt does not have a PCP)   Reason For Admission avulsion of right lower extremity   People in Home alone   Do you expect to return to your current living situation? Yes   Do you have help at home or someone to help you manage your care at home? No   Current cognitive status: Alert/Oriented;No Deficits   Equipment Currently Used at Home none   Do you currently have service(s) that help you manage your care at home? No   Do you take prescription medications? No   Who is going to help you get home at discharge? Pt states his sister Linh [phone] will drive him home   How do you get to doctors appointments? car, drives self   Are you on dialysis? No   Do you take coumadin? No   Discharge Plan A Home Health   Discharge Plan B Home Health   Discharge Plan discussed with: Patient   Transition of Care Barriers None     Patient states he lives in a single level home alone. He states he is independent with ADL's and is able to drive prior to admission. He does not have insurance. He does not have a PCP. He does not have DME. He is not active with a home health agency. Will follow for discharge needs.

## 2023-07-05 NOTE — PLAN OF CARE
Spoke to ANSHUL Sahu for trauma. Plan is to change to wet to dry dressings starting tomorrow and d/c home with home health. [facility] next on rotation. Referral sent via care port.

## 2023-07-06 VITALS
BODY MASS INDEX: 30.61 KG/M2 | OXYGEN SATURATION: 98 % | DIASTOLIC BLOOD PRESSURE: 78 MMHG | SYSTOLIC BLOOD PRESSURE: 120 MMHG | HEART RATE: 62 BPM | HEIGHT: 67 IN | TEMPERATURE: 98 F | WEIGHT: 195 LBS | RESPIRATION RATE: 18 BRPM

## 2023-07-06 PROBLEM — S81.801A: Status: ACTIVE | Noted: 2023-07-06

## 2023-07-06 PROBLEM — T14.90XA TRAUMA: Status: ACTIVE | Noted: 2023-07-06

## 2023-07-06 LAB
ALBUMIN SERPL-MCNC: 3.4 G/DL (ref 3.5–5)
ALBUMIN/GLOB SERPL: 1.1 RATIO (ref 1.1–2)
ALP SERPL-CCNC: 48 UNIT/L (ref 40–150)
ALT SERPL-CCNC: 22 UNIT/L (ref 0–55)
AST SERPL-CCNC: 24 UNIT/L (ref 5–34)
BASOPHILS # BLD AUTO: 0.06 X10(3)/MCL
BASOPHILS NFR BLD AUTO: 0.7 %
BILIRUBIN DIRECT+TOT PNL SERPL-MCNC: 0.3 MG/DL
BUN SERPL-MCNC: 7.9 MG/DL (ref 8.9–20.6)
CALCIUM SERPL-MCNC: 8.8 MG/DL (ref 8.4–10.2)
CHLORIDE SERPL-SCNC: 102 MMOL/L (ref 98–107)
CO2 SERPL-SCNC: 28 MMOL/L (ref 22–29)
CREAT SERPL-MCNC: 1.06 MG/DL (ref 0.73–1.18)
CRP SERPL-MCNC: 45.2 MG/L
EOSINOPHIL # BLD AUTO: 0.29 X10(3)/MCL (ref 0–0.9)
EOSINOPHIL NFR BLD AUTO: 3.2 %
ERYTHROCYTE [DISTWIDTH] IN BLOOD BY AUTOMATED COUNT: 13.2 % (ref 11.5–17)
GFR SERPLBLD CREATININE-BSD FMLA CKD-EPI: >60 MLS/MIN/1.73/M2
GLOBULIN SER-MCNC: 3 GM/DL (ref 2.4–3.5)
GLUCOSE SERPL-MCNC: 115 MG/DL (ref 74–100)
HCT VFR BLD AUTO: 40.6 % (ref 42–52)
HGB BLD-MCNC: 13.7 G/DL (ref 14–18)
IMM GRANULOCYTES # BLD AUTO: 0.02 X10(3)/MCL (ref 0–0.04)
IMM GRANULOCYTES NFR BLD AUTO: 0.2 %
LYMPHOCYTES # BLD AUTO: 3.92 X10(3)/MCL (ref 0.6–4.6)
LYMPHOCYTES NFR BLD AUTO: 42.7 %
MCH RBC QN AUTO: 32.6 PG (ref 27–31)
MCHC RBC AUTO-ENTMCNC: 33.7 G/DL (ref 33–36)
MCV RBC AUTO: 96.7 FL (ref 80–94)
MONOCYTES # BLD AUTO: 1 X10(3)/MCL (ref 0.1–1.3)
MONOCYTES NFR BLD AUTO: 10.9 %
NEUTROPHILS # BLD AUTO: 3.89 X10(3)/MCL (ref 2.1–9.2)
NEUTROPHILS NFR BLD AUTO: 42.3 %
NRBC BLD AUTO-RTO: 0 %
PLATELET # BLD AUTO: 295 X10(3)/MCL (ref 130–400)
PMV BLD AUTO: 9.3 FL (ref 7.4–10.4)
POTASSIUM SERPL-SCNC: 3.7 MMOL/L (ref 3.5–5.1)
PREALB SERPL-MCNC: 24.3 MG/DL (ref 18–45)
PROT SERPL-MCNC: 6.4 GM/DL (ref 6.4–8.3)
RBC # BLD AUTO: 4.2 X10(6)/MCL (ref 4.7–6.1)
SODIUM SERPL-SCNC: 138 MMOL/L (ref 136–145)
WBC # SPEC AUTO: 9.18 X10(3)/MCL (ref 4.5–11.5)

## 2023-07-06 PROCEDURE — 85025 COMPLETE CBC W/AUTO DIFF WBC: CPT

## 2023-07-06 PROCEDURE — 80053 COMPREHEN METABOLIC PANEL: CPT

## 2023-07-06 PROCEDURE — 97161 PT EVAL LOW COMPLEX 20 MIN: CPT

## 2023-07-06 PROCEDURE — 86140 C-REACTIVE PROTEIN: CPT

## 2023-07-06 PROCEDURE — 25000003 PHARM REV CODE 250

## 2023-07-06 PROCEDURE — 25000003 PHARM REV CODE 250: Performed by: NURSE PRACTITIONER

## 2023-07-06 PROCEDURE — 84134 ASSAY OF PREALBUMIN: CPT

## 2023-07-06 PROCEDURE — 63600175 PHARM REV CODE 636 W HCPCS

## 2023-07-06 RX ORDER — OXYCODONE AND ACETAMINOPHEN 5; 325 MG/1; MG/1
1 TABLET ORAL EVERY 6 HOURS PRN
Qty: 20 TABLET | Refills: 0 | Status: SHIPPED | OUTPATIENT
Start: 2023-07-06 | End: 2023-07-13

## 2023-07-06 RX ORDER — OXYCODONE HYDROCHLORIDE 5 MG/1
5 TABLET ORAL EVERY 6 HOURS PRN
Qty: 21 TABLET | Refills: 0 | Status: SHIPPED | OUTPATIENT
Start: 2023-07-06 | End: 2023-07-06 | Stop reason: HOSPADM

## 2023-07-06 RX ORDER — AMOXICILLIN AND CLAVULANATE POTASSIUM 875; 125 MG/1; MG/1
1 TABLET, FILM COATED ORAL EVERY 12 HOURS
Qty: 14 TABLET | Refills: 0 | Status: SHIPPED | OUTPATIENT
Start: 2023-07-06 | End: 2023-07-13

## 2023-07-06 RX ORDER — ACETAMINOPHEN 325 MG/1
650 TABLET ORAL EVERY 6 HOURS PRN
Qty: 60 TABLET | Refills: 0 | Status: CANCELLED | OUTPATIENT
Start: 2023-07-06 | End: 2023-07-17

## 2023-07-06 RX ORDER — OXYCODONE HYDROCHLORIDE 5 MG/1
5 TABLET ORAL EVERY 6 HOURS PRN
Qty: 30 TABLET | Refills: 0 | Status: SHIPPED | OUTPATIENT
Start: 2023-07-06 | End: 2023-07-06 | Stop reason: SDUPTHER

## 2023-07-06 RX ORDER — OXYCODONE AND ACETAMINOPHEN 5; 325 MG/1; MG/1
1 TABLET ORAL EVERY 4 HOURS PRN
Qty: 12 TABLET | Refills: 0 | Status: SHIPPED | OUTPATIENT
Start: 2023-07-06 | End: 2023-07-06

## 2023-07-06 RX ORDER — AMOXICILLIN AND CLAVULANATE POTASSIUM 875; 125 MG/1; MG/1
1 TABLET, FILM COATED ORAL EVERY 12 HOURS
Qty: 14 TABLET | Refills: 0 | Status: SHIPPED | OUTPATIENT
Start: 2023-07-06 | End: 2023-07-06 | Stop reason: RX

## 2023-07-06 RX ORDER — AMOXICILLIN AND CLAVULANATE POTASSIUM 875; 125 MG/1; MG/1
1 TABLET, FILM COATED ORAL EVERY 12 HOURS
Qty: 14 TABLET | Refills: 0 | Status: CANCELLED | OUTPATIENT
Start: 2023-07-06 | End: 2023-07-13

## 2023-07-06 RX ORDER — OXYCODONE AND ACETAMINOPHEN 5; 325 MG/1; MG/1
1 TABLET ORAL EVERY 4 HOURS PRN
Qty: 12 TABLET | Refills: 0 | Status: CANCELLED | OUTPATIENT
Start: 2023-07-06

## 2023-07-06 RX ORDER — ACETAMINOPHEN 325 MG/1
650 TABLET ORAL EVERY 6 HOURS PRN
Qty: 60 TABLET | Refills: 0 | Status: SHIPPED | OUTPATIENT
Start: 2023-07-06 | End: 2023-07-17

## 2023-07-06 RX ADMIN — DOCUSATE SODIUM 100 MG: 100 CAPSULE, LIQUID FILLED ORAL at 09:07

## 2023-07-06 RX ADMIN — OXYCODONE HYDROCHLORIDE 10 MG: 10 TABLET ORAL at 12:07

## 2023-07-06 RX ADMIN — ACETAMINOPHEN 650 MG: 325 TABLET, FILM COATED ORAL at 09:07

## 2023-07-06 RX ADMIN — ENOXAPARIN SODIUM 40 MG: 40 INJECTION SUBCUTANEOUS at 09:07

## 2023-07-06 RX ADMIN — OXYCODONE HYDROCHLORIDE 10 MG: 10 TABLET ORAL at 04:07

## 2023-07-06 RX ADMIN — METHOCARBAMOL 500 MG: 500 TABLET ORAL at 09:07

## 2023-07-06 RX ADMIN — GABAPENTIN 300 MG: 300 CAPSULE ORAL at 09:07

## 2023-07-06 RX ADMIN — POLYETHYLENE GLYCOL 3350 17 G: 17 POWDER, FOR SOLUTION ORAL at 09:07

## 2023-07-06 RX ADMIN — AMOXICILLIN AND CLAVULANATE POTASSIUM 1 TABLET: 875; 125 TABLET ORAL at 09:07

## 2023-07-06 NOTE — NURSING
Pt discharged accompanied by family to stop at retail pharmacy on first floor to  prescriptions before leaving hospital. Discharge instructions given, pt verbalizes understanding. VSS, pt in no apparent distress, wound vac taken off, wet to dry dressing added. Pt given wound care routine instructions as well as supplies for dressing changes. Patient satisfied with care.

## 2023-07-06 NOTE — PT/OT/SLP EVAL
Physical Therapy Evaluation and Discharge Note    Patient Name:  Billy Correa   MRN:  [id number]    Recommendations:     Discharge Recommendations: home with home health  Discharge Equipment Recommendations: none   Barriers to discharge: None    Assessment:     Billy Correa is a 43 y.o. male admitted with a medical diagnosis of avulsion to RLE after his neighbor's dog bit him in the leg. Pt is s/p washout and wound vac placement.  At this time, patient is functioning at their prior level of function, with the exception of pain at wound site, and does not require further acute PT services.     Recent Surgery: Procedure(s) (LRB):  WASHOUT (Right)  APPLICATION, WOUND VAC (Right)  DEBRIDEMENT, WOUND (Right) 2 Days Post-Op    Plan:     During this hospitalization, patient does not require further acute PT services.  Please re-consult if situation changes.      Subjective     Chief Complaint: Leg hurts  Patient/Family Comments/goals: go home, return to work  Pain/Comfort:  Pain Rating 1: 8/10  Location - Side 1: Right  Location - Orientation 1: lower  Location 1: leg  Pain Addressed 1: Distraction, Reposition    Patients cultural, spiritual, Congregational conflicts given the current situation: no    Living Environment:  Pt lives alone in a [facility] with no [facility].   Prior to admission, patients level of function was independent, drives fork lifts for work.  Equipment used at home: none.  DME owned (not currently used): none.  Upon discharge, patient will have assistance from self.    Objective:     Communicated with RN prior to session.  Patient found right sidelying with wound vac upon PT entry to room.    General Precautions: Standard,      Orthopedic Precautions:N/A   Braces: N/A  Respiratory Status: Room air  Blood Pressure:     Exams:  Cognitive Exam:  Patient is oriented to Person, Place, Time, and Situation  RLE Strength: WFL except did not perform MMT to distal LE 2/2 pain/wound  LLE Strength: WFL    Functional  Mobility:  Bed Mobility:     Supine to Sit: independence  Transfers:     Sit to Stand:  independence with no AD  Gait: 180ft with independence, decreased stance time on RLE, antalgic gait    AM-PAC 6 CLICK MOBILITY  Total Score:24       Treatment and Education:      Patient provided with verbal education regarding clearance for mobility within room, role of HH, safety precautions.  Understanding was verbalized.     Patient left up in chair with all lines intact, call button in reach, and RN notified.    GOALS:   Multidisciplinary Problems       Physical Therapy Goals       Not on file                    History:     No past medical history on file.    Past Surgical History:   Procedure Laterality Date    APPLICATION OF WOUND VACUUM-ASSISTED CLOSURE DEVICE Right 7/4/2023    Procedure: APPLICATION, WOUND VAC;  Surgeon: Kal Suggs Jr., MD;  Location: [facility] OR;  Service: General;  Laterality: Right;    WASHOUT Right 7/4/2023    Procedure: WASHOUT;  Surgeon: Kal Suggs Jr., MD;  Location: [facility] OR;  Service: General;  Laterality: Right;    WOUND DEBRIDEMENT Right 7/4/2023    Procedure: DEBRIDEMENT, WOUND;  Surgeon: Kal Suggs Jr., MD;  Location: [facility] OR;  Service: General;  Laterality: Right;       Time Tracking:     PT Received On: 07/06/23  PT Start Time: 0857     PT Stop Time: 0907  PT Total Time (min): 10 min     Billable Minutes: Evaluation LOW      07/06/2023

## 2023-07-06 NOTE — PLAN OF CARE
Brief intervention complete. Patient accepted packet on [employer]. He states he is thankful for the information.

## 2023-07-06 NOTE — DISCHARGE SUMMARY
[facility] - 8th Floor Med Surg  General Surgery  Discharge Summary      Patient Name: Billy Correa  MRN: [id number]  Admission Date: 7/3/2023  Hospital Length of Stay: 3 days  Discharge Date and Time:  07/06/2023 12:50 PM  Attending Physician: Kal Suggs MD   Discharging Provider: Natali Rodriguez MD  Primary Care Provider: Primary Doctor No    HPI:   44 y/o male presented with a dog bite to the right lower extremity.     Procedure(s) (LRB):  WASHOUT (Right)  APPLICATION, WOUND VAC (Right)  DEBRIDEMENT, WOUND (Right)      Indwelling Lines/Drains at time of discharge:   Lines/Drains/Airways       None                 Hospital Course:   Pt presented with dog bite to RLE. The wound was washed out at bedside and a wound vac was placed on HD1. He was also evaluated by plastic surgery who recommended possible skin graft in the future. He was placed on IV antibiotics for 24 hours and then transitioned to PO. He remained afebrile throughout his hospital stay. He was evaluated by physical therapy and has met milestones for discharge.     Goals of Care Treatment Preferences:  Code Status: Full Code      Consults:   Consults (From admission, onward)          Status Ordering Provider     Inpatient consult to Social Work/Case Management  Once        Provider:  (Not yet assigned)    Acknowledged VLADIMIR DE LA GARZA     Inpatient consult to Social Work/Case Management  Once        Provider:  (Not yet assigned)    Acknowledged NATALI RODRIGUEZ            Significant Diagnostic Studies: Labs:   CBC   Recent Labs   Lab 07/05/23  0507 07/06/23  0514   WBC 8.27 9.18   HGB 12.9* 13.7*   HCT 38.5* 40.6*    295         Pending Diagnostic Studies:       None          Final Active Diagnoses:    Diagnosis Date Noted POA    PRINCIPAL PROBLEM:  Avulsion of right lower extremity [S81.801A] 07/06/2023 Yes    Trauma [T14.90XA] 07/06/2023 Yes      Problems Resolved During this Admission:      Discharged Condition:  fair    Disposition: Home-Health Care [facility]    Follow Up:   Follow-up Information       [facility]. Go on 7/25/2023.    Why: [address]    7/25/23 @ 1020 for wound check  Contact information:  [address] [place] [address]  [phone]               [facility] Follow up.    Specialty: Home Health Services  Why: This is your home health provider  Contact information:  [address]  [place] [address]  [phone]                           Patient Instructions:      Diet Adult Regular     Remove dressing in 48 hours     Change dressing (specify)   Order Comments: Daily wet to dry dressing changes     Notify your health care provider if you experience any of the following:  temperature >100.4     Notify your health care provider if you experience any of the following:  redness, tenderness, or signs of infection (pain, swelling, redness, odor or green/yellow discharge around incision site)     Activity as tolerated     Medications:  Reconciled Home Medications:      Medication List        START taking these medications      acetaminophen 325 MG tablet  Commonly known as: TYLENOL  Take 2 tablets (650 mg total) by mouth every 6 (six) hours as needed for Pain.     amoxicillin-clavulanate 875-125mg 875-125 mg per tablet  Commonly known as: AUGMENTIN  Take 1 tablet by mouth every 12 (twelve) hours. for 7 days     oxyCODONE-acetaminophen 5-325 mg per tablet  Commonly known as: PERCOCET  Take 1 tablet by mouth every 4 (four) hours as needed for Pain.            - unable to continue wound vac d/t lack of insurance  - BID wet to dry dressing changes  - pt will need OP f/u with plastic surgery for possible STSG      Thomas Cardoza MD  Trauma Surgery  7/6/2023 12:55 PM

## 2023-07-25 ENCOUNTER — DOCUMENTATION ONLY (OUTPATIENT)
Dept: SURGERY | Facility: HOSPITAL | Age: 43
End: 2023-07-25

## 2023-07-25 RX ORDER — OXYCODONE AND ACETAMINOPHEN 5; 325 MG/1; MG/1
1 TABLET ORAL EVERY 4 HOURS PRN
Qty: 20 TABLET | Refills: 0 | Status: SHIPPED | OUTPATIENT
Start: 2023-07-25

## 2023-07-25 NOTE — PROGRESS NOTES
General Surgery Clinic    Subjective:  43-year-old male who presents to the ED following dog bite to the right lower extremity with avulsion. S/p washout on 7/4/2023 and wound vac placement on 7/5/2023. Wound vac removed prior to discharge and initiated wet to dry dressing changes. Plan for outpatient follow up with PRS for possible skin graft.    Reports daily wet to dry dressing changes. Denies any n/v/f/c. Wound care coming to assist with dressing changes, per patient stated will stop visits.     Objective:  14 x 10 cm wound present along R posterior lower leg. Tendon visible. Tissue pink, well perfused. Drainage serosanguinous.     Assessment/Plan:  -Wound healing appropriately, still requires daily wet to dry dressing changes.   -Referral placed to [facility], will require follow up for evaluation of possible skin flap  -Will reach out to  to continue to bring patient supplies  -Pain meds sent to pharmacy  -RTC in 1 month for wound recheck    Vamsi Wong MD  \[facility] [place] [facility]\"" Family Medicine [facility]

## 2023-07-28 ENCOUNTER — DOCUMENT SCAN (OUTPATIENT)
Dept: HOME HEALTH SERVICES | Facility: HOSPITAL | Age: 43
End: 2023-07-28

## 2023-07-28 ENCOUNTER — EXTERNAL HOME HEALTH (OUTPATIENT)
Dept: HOME HEALTH SERVICES | Facility: HOSPITAL | Age: 43
End: 2023-07-28

## 2023-08-22 ENCOUNTER — DOCUMENTATION ONLY (OUTPATIENT)
Dept: SURGERY | Facility: HOSPITAL | Age: 43
End: 2023-08-22

## 2023-08-22 NOTE — PROGRESS NOTES
[facility] Follow Up    Billy Correa  [id number]  08/22/2023    Brief HPI  43 y.o. male who presents for scheduled follow up after a dog bite to his RLE that occurred 7/3-7/4. He has been coming to clinic for regular wound checks since then with his last appointment on 7/25/2023. Patient reports some pain that is manageable and daily dressing changes that he and his wife are both comfortable doing. He is seen by home health wound care for more frequent wound checks.    Physical Exam  General: well developed, NAD  HEENT: normocephalic atraumatic  Resp: NWOB on RA  Abdomen: nondistended, nontender  Extremities: RLE with large open wound to posterior calf. Exposed tendon in the middle of the wound bed with viable tissue throughout and healthy granulation bed  Vascular: 2+ PT pulse on ipsilateral side to dog bite    Plan  - RTC 1 month for repeat wound check  - Prescribed Tramadol 50mg q6h PRN for pain. 20 pills no refill  at today's visit  - Dressing changed at bedside and patient given wound care supplies    Contreras Milligan MD  [facility] General Surgery PGY-1

## 2023-10-24 ENCOUNTER — OFFICE VISIT (OUTPATIENT)
Dept: SURGERY | Facility: CLINIC | Age: 43
End: 2023-10-24

## 2023-10-24 DIAGNOSIS — S91.051D DOG BITE OF ANKLE, RIGHT, SUBSEQUENT ENCOUNTER: Primary | ICD-10-CM
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
## 2023-10-24 PROCEDURE — 99203 PR OFFICE/OUTPT VISIT, NEW, LEVL III, 30-44 MIN: ICD-10-PCS | Mod: ,,, | Performed by: STUDENT IN AN ORGANIZED HEALTH CARE EDUCATION/TRAINING PROGRAM

## 2023-10-24 PROCEDURE — 99203 OFFICE O/P NEW LOW 30 MIN: CPT | Mod: ,,, | Performed by: STUDENT IN AN ORGANIZED HEALTH CARE EDUCATION/TRAINING PROGRAM

## 2023-10-24 RX ORDER — TRAMADOL HYDROCHLORIDE 50 MG/1
50 TABLET ORAL EVERY 6 HOURS
Qty: 10 EACH | Refills: 0 | Status: SHIPPED | OUTPATIENT
Start: 2023-10-24

## 2023-10-24 NOTE — PROGRESS NOTES
[facility] Follow Up    Brief HPI  43 y.o. male who presents for scheduled follow up after a dog bite to his RLE that occurred 7/3-7/4. He has been coming to clinic for regular wound checks since then with his last appointment on 7/25/2023. Patient reports some pain that is manageable and daily dressing changes that he and his wife are both comfortable doing. He is seen by home health wound care for more frequent wound checks.     Physical Exam  General: well developed, NAD  HEENT: normocephalic atraumatic  Resp: NWOB on RA  Abdomen: nondistended, nontender  Extremities: RLE with large open wound to posterior calf. Exposed tendon in the middle of the wound bed with viable tissue throughout and healthy granulation bed  Vascular: 2+ PT pulse on ipsilateral side to dog bite         Plan  - RTC prn   - Prescribed Tramadol 50mg q6h PRN for pain. 10 pills no refill  at today's visit  - Dressing changed at bedside and patient given wound care supplies     Eduardo Cespedes MD   [employer] General Surgery, PGY-1

## (undated) DEVICE — Device

## (undated) DEVICE — GLOVE PROTEXIS HYDROGEL SZ7.5

## (undated) DEVICE — DRAPE FLUID WARMER ORS 44X44IN

## (undated) DEVICE — SOL NACL IRR 1000ML BTL

## (undated) DEVICE — KIT SURGICAL TURNOVER

## (undated) DEVICE — TRAY SKIN SCRUB WET PREMIUM

## (undated) DEVICE — CULTSWAB+ AMIES W/O CHARC SNG

## (undated) DEVICE — KIT DRAIN WOUND RND SPRNG RESV

## (undated) DEVICE — ELECTRODE PATIENT RETURN DISP

## (undated) DEVICE — SWAB CULTURETTE SINGLE